# Patient Record
Sex: MALE | Race: WHITE | Employment: OTHER | URBAN - METROPOLITAN AREA
[De-identification: names, ages, dates, MRNs, and addresses within clinical notes are randomized per-mention and may not be internally consistent; named-entity substitution may affect disease eponyms.]

---

## 2017-01-01 ENCOUNTER — APPOINTMENT (OUTPATIENT)
Dept: GENERAL RADIOLOGY | Age: 77
DRG: 698 | End: 2017-01-01
Attending: EMERGENCY MEDICINE
Payer: MEDICARE

## 2017-01-01 ENCOUNTER — APPOINTMENT (OUTPATIENT)
Dept: CT IMAGING | Age: 77
DRG: 698 | End: 2017-01-01
Attending: EMERGENCY MEDICINE
Payer: MEDICARE

## 2017-01-01 ENCOUNTER — HOSPITAL ENCOUNTER (INPATIENT)
Age: 77
LOS: 1 days | DRG: 698 | End: 2017-01-06
Attending: EMERGENCY MEDICINE | Admitting: FAMILY MEDICINE
Payer: MEDICARE

## 2017-01-01 VITALS
DIASTOLIC BLOOD PRESSURE: 26 MMHG | BODY MASS INDEX: 25.33 KG/M2 | TEMPERATURE: 97.8 F | OXYGEN SATURATION: 79 % | WEIGHT: 187 LBS | HEIGHT: 72 IN | SYSTOLIC BLOOD PRESSURE: 38 MMHG

## 2017-01-01 DIAGNOSIS — R91.8 PULMONARY INFILTRATES ON CXR: ICD-10-CM

## 2017-01-01 DIAGNOSIS — A41.9 SEPSIS, DUE TO UNSPECIFIED ORGANISM: ICD-10-CM

## 2017-01-01 DIAGNOSIS — R41.82 ALTERED MENTAL STATUS, UNSPECIFIED ALTERED MENTAL STATUS TYPE: Primary | ICD-10-CM

## 2017-01-01 DIAGNOSIS — T83.511A URINARY TRACT INFECTION ASSOCIATED WITH INDWELLING URETHRAL CATHETER, INITIAL ENCOUNTER (HCC): ICD-10-CM

## 2017-01-01 DIAGNOSIS — N39.0 URINARY TRACT INFECTION ASSOCIATED WITH INDWELLING URETHRAL CATHETER, INITIAL ENCOUNTER (HCC): ICD-10-CM

## 2017-01-01 LAB
ALBUMIN SERPL BCP-MCNC: 3.6 G/DL (ref 3.4–5)
ALBUMIN/GLOB SERPL: 0.7 {RATIO} (ref 0.8–1.7)
ALP SERPL-CCNC: 87 U/L (ref 45–117)
ALT SERPL-CCNC: 33 U/L (ref 16–61)
ANION GAP BLD CALC-SCNC: 12 MMOL/L (ref 3–18)
APPEARANCE UR: ABNORMAL
APPEARANCE UR: ABNORMAL
APTT PPP: 27.1 SEC (ref 23–36.4)
AST SERPL W P-5'-P-CCNC: 23 U/L (ref 15–37)
ATRIAL RATE: 117 BPM
BACTERIA URNS QL MICRO: ABNORMAL /HPF
BACTERIA URNS QL MICRO: ABNORMAL /HPF
BASOPHILS # BLD AUTO: 0 K/UL (ref 0–0.1)
BASOPHILS # BLD: 0 % (ref 0–2)
BILIRUB SERPL-MCNC: 0.3 MG/DL (ref 0.2–1)
BILIRUB UR QL: ABNORMAL
BILIRUB UR QL: NEGATIVE
BNP SERPL-MCNC: 3118 PG/ML (ref 0–1800)
BUN SERPL-MCNC: 38 MG/DL (ref 7–18)
BUN/CREAT SERPL: 14 (ref 12–20)
CALCIUM SERPL-MCNC: 9.6 MG/DL (ref 8.5–10.1)
CALCULATED P AXIS, ECG09: 68 DEGREES
CALCULATED R AXIS, ECG10: -7 DEGREES
CALCULATED T AXIS, ECG11: 95 DEGREES
CHLORIDE SERPL-SCNC: 106 MMOL/L (ref 100–108)
CO2 SERPL-SCNC: 21 MMOL/L (ref 21–32)
COLOR UR: ABNORMAL
COLOR UR: YELLOW
CREAT SERPL-MCNC: 2.73 MG/DL (ref 0.6–1.3)
DIAGNOSIS, 93000: NORMAL
DIFFERENTIAL METHOD BLD: ABNORMAL
EOSINOPHIL # BLD: 0 K/UL (ref 0–0.4)
EOSINOPHIL NFR BLD: 0 % (ref 0–5)
EPITH CASTS URNS QL MICRO: ABNORMAL /LPF (ref 0–5)
ERYTHROCYTE [DISTWIDTH] IN BLOOD BY AUTOMATED COUNT: 13.9 % (ref 11.6–14.5)
FLUAV AG NPH QL IA: NEGATIVE
FLUBV AG NOSE QL IA: NEGATIVE
GLOBULIN SER CALC-MCNC: 5.4 G/DL (ref 2–4)
GLUCOSE SERPL-MCNC: 182 MG/DL (ref 74–99)
GLUCOSE UR STRIP.AUTO-MCNC: NEGATIVE MG/DL
GLUCOSE UR STRIP.AUTO-MCNC: NEGATIVE MG/DL
HCT VFR BLD AUTO: 43.2 % (ref 36–48)
HGB BLD-MCNC: 14.4 G/DL (ref 13–16)
HGB UR QL STRIP: ABNORMAL
HGB UR QL STRIP: ABNORMAL
INR PPP: 1.1 (ref 0.8–1.2)
KETONES UR QL STRIP.AUTO: NEGATIVE MG/DL
KETONES UR QL STRIP.AUTO: NEGATIVE MG/DL
LACTATE BLD-SCNC: 4.9 MMOL/L (ref 0.4–2)
LACTATE BLD-SCNC: 5.5 MMOL/L (ref 0.4–2)
LACTATE BLD-SCNC: 6.2 MMOL/L (ref 0.4–2)
LACTATE BLD-SCNC: 8.8 MMOL/L (ref 0.4–2)
LEUKOCYTE ESTERASE UR QL STRIP.AUTO: ABNORMAL
LEUKOCYTE ESTERASE UR QL STRIP.AUTO: ABNORMAL
LYMPHOCYTES # BLD AUTO: 18 % (ref 21–52)
LYMPHOCYTES # BLD: 2.9 K/UL (ref 0.9–3.6)
MCH RBC QN AUTO: 30.4 PG (ref 24–34)
MCHC RBC AUTO-ENTMCNC: 33.3 G/DL (ref 31–37)
MCV RBC AUTO: 91.3 FL (ref 74–97)
MONOCYTES # BLD: 1.3 K/UL (ref 0.05–1.2)
MONOCYTES NFR BLD AUTO: 8 % (ref 3–10)
NEUTS SEG # BLD: 11.5 K/UL (ref 1.8–8)
NEUTS SEG NFR BLD AUTO: 74 % (ref 40–73)
NITRITE UR QL STRIP.AUTO: NEGATIVE
NITRITE UR QL STRIP.AUTO: NEGATIVE
P-R INTERVAL, ECG05: 154 MS
PH UR STRIP: 7 [PH] (ref 5–8)
PH UR STRIP: 9 [PH] (ref 5–8)
PLATELET # BLD AUTO: 446 K/UL (ref 135–420)
PMV BLD AUTO: 9.6 FL (ref 9.2–11.8)
POTASSIUM SERPL-SCNC: 4.8 MMOL/L (ref 3.5–5.5)
PROT SERPL-MCNC: 9 G/DL (ref 6.4–8.2)
PROT UR STRIP-MCNC: 100 MG/DL
PROT UR STRIP-MCNC: >300 MG/DL
PROTHROMBIN TIME: 14 SEC (ref 11.5–15.2)
Q-T INTERVAL, ECG07: 326 MS
QRS DURATION, ECG06: 74 MS
QTC CALCULATION (BEZET), ECG08: 454 MS
RBC # BLD AUTO: 4.73 M/UL (ref 4.7–5.5)
RBC #/AREA URNS HPF: ABNORMAL /HPF (ref 0–5)
SODIUM SERPL-SCNC: 139 MMOL/L (ref 136–145)
SP GR UR REFRACTOMETRY: 1.01 (ref 1–1.03)
SP GR UR REFRACTOMETRY: 1.02 (ref 1–1.03)
TRI-PHOS CRY URNS QL MICRO: ABNORMAL
UROBILINOGEN UR QL STRIP.AUTO: 0.2 EU/DL (ref 0.2–1)
UROBILINOGEN UR QL STRIP.AUTO: 0.2 EU/DL (ref 0.2–1)
VENTRICULAR RATE, ECG03: 117 BPM
WBC # BLD AUTO: 15.7 K/UL (ref 4.6–13.2)
WBC URNS QL MICRO: ABNORMAL /HPF (ref 0–4)
WBC URNS QL MICRO: ABNORMAL /HPF (ref 0–4)

## 2017-01-01 PROCEDURE — 74011000250 HC RX REV CODE- 250: Performed by: EMERGENCY MEDICINE

## 2017-01-01 PROCEDURE — 96366 THER/PROPH/DIAG IV INF ADDON: CPT

## 2017-01-01 PROCEDURE — 85610 PROTHROMBIN TIME: CPT | Performed by: EMERGENCY MEDICINE

## 2017-01-01 PROCEDURE — 83605 ASSAY OF LACTIC ACID: CPT

## 2017-01-01 PROCEDURE — 96365 THER/PROPH/DIAG IV INF INIT: CPT

## 2017-01-01 PROCEDURE — 70450 CT HEAD/BRAIN W/O DYE: CPT

## 2017-01-01 PROCEDURE — 74176 CT ABD & PELVIS W/O CONTRAST: CPT

## 2017-01-01 PROCEDURE — 74011250636 HC RX REV CODE- 250/636: Performed by: FAMILY MEDICINE

## 2017-01-01 PROCEDURE — 96360 HYDRATION IV INFUSION INIT: CPT

## 2017-01-01 PROCEDURE — 96361 HYDRATE IV INFUSION ADD-ON: CPT

## 2017-01-01 PROCEDURE — 85730 THROMBOPLASTIN TIME PARTIAL: CPT | Performed by: EMERGENCY MEDICINE

## 2017-01-01 PROCEDURE — 74011000258 HC RX REV CODE- 258: Performed by: EMERGENCY MEDICINE

## 2017-01-01 PROCEDURE — 87086 URINE CULTURE/COLONY COUNT: CPT | Performed by: EMERGENCY MEDICINE

## 2017-01-01 PROCEDURE — 80053 COMPREHEN METABOLIC PANEL: CPT | Performed by: EMERGENCY MEDICINE

## 2017-01-01 PROCEDURE — 87804 INFLUENZA ASSAY W/OPTIC: CPT | Performed by: EMERGENCY MEDICINE

## 2017-01-01 PROCEDURE — 96367 TX/PROPH/DG ADDL SEQ IV INF: CPT

## 2017-01-01 PROCEDURE — 65660000000 HC RM CCU STEPDOWN

## 2017-01-01 PROCEDURE — 96375 TX/PRO/DX INJ NEW DRUG ADDON: CPT

## 2017-01-01 PROCEDURE — 83880 ASSAY OF NATRIURETIC PEPTIDE: CPT | Performed by: FAMILY MEDICINE

## 2017-01-01 PROCEDURE — 87186 SC STD MICRODIL/AGAR DIL: CPT | Performed by: EMERGENCY MEDICINE

## 2017-01-01 PROCEDURE — 99285 EMERGENCY DEPT VISIT HI MDM: CPT

## 2017-01-01 PROCEDURE — 51702 INSERT TEMP BLADDER CATH: CPT

## 2017-01-01 PROCEDURE — 81001 URINALYSIS AUTO W/SCOPE: CPT | Performed by: EMERGENCY MEDICINE

## 2017-01-01 PROCEDURE — 74011250636 HC RX REV CODE- 250/636: Performed by: EMERGENCY MEDICINE

## 2017-01-01 PROCEDURE — 71010 XR CHEST PORT: CPT

## 2017-01-01 PROCEDURE — 87077 CULTURE AEROBIC IDENTIFY: CPT | Performed by: EMERGENCY MEDICINE

## 2017-01-01 PROCEDURE — 87040 BLOOD CULTURE FOR BACTERIA: CPT | Performed by: EMERGENCY MEDICINE

## 2017-01-01 PROCEDURE — 85025 COMPLETE CBC W/AUTO DIFF WBC: CPT | Performed by: EMERGENCY MEDICINE

## 2017-01-01 PROCEDURE — 93005 ELECTROCARDIOGRAM TRACING: CPT

## 2017-01-01 RX ORDER — FUROSEMIDE 10 MG/ML
40 INJECTION INTRAMUSCULAR; INTRAVENOUS
Status: COMPLETED | OUTPATIENT
Start: 2017-01-01 | End: 2017-01-01

## 2017-01-01 RX ORDER — METOPROLOL TARTRATE 5 MG/5ML
2.5 INJECTION INTRAVENOUS ONCE
Status: COMPLETED | OUTPATIENT
Start: 2017-01-01 | End: 2017-01-01

## 2017-01-01 RX ORDER — SODIUM CHLORIDE 9 MG/ML
75 INJECTION, SOLUTION INTRAVENOUS CONTINUOUS
Status: DISCONTINUED | OUTPATIENT
Start: 2017-01-01 | End: 2017-01-01 | Stop reason: HOSPADM

## 2017-01-01 RX ADMIN — SODIUM CHLORIDE 250 ML: 900 INJECTION, SOLUTION INTRAVENOUS at 00:50

## 2017-01-01 RX ADMIN — SODIUM CHLORIDE 75 ML/HR: 900 INJECTION, SOLUTION INTRAVENOUS at 21:34

## 2017-01-01 RX ADMIN — METOPROLOL TARTRATE 2.5 MG: 5 INJECTION INTRAVENOUS at 16:05

## 2017-01-01 RX ADMIN — SODIUM CHLORIDE 1000 ML: 900 INJECTION, SOLUTION INTRAVENOUS at 13:52

## 2017-01-01 RX ADMIN — PIPERACILLIN SODIUM AND TAZOBACTAM SODIUM 4.5 G: 4; .5 INJECTION, POWDER, LYOPHILIZED, FOR SOLUTION INTRAVENOUS at 14:14

## 2017-01-01 RX ADMIN — FUROSEMIDE 40 MG: 10 INJECTION, SOLUTION INTRAVENOUS at 15:10

## 2017-01-01 RX ADMIN — VANCOMYCIN HYDROCHLORIDE 1696 MG: 10 INJECTION, POWDER, LYOPHILIZED, FOR SOLUTION INTRAVENOUS at 16:50

## 2017-01-05 NOTE — H&P
History and Physical    Patient: Pari Petersen MRN: [id number]  SSN: [id number]    YOB: 1940  Age: 67 y.o. Sex: male      Subjective:      Pari Petersen is a 67 y.o. male who is a nursing home patient with dementia and has indwelling oh due to urinary retention. Patient sent to the ER because he was \"complaining of chest pain and was unresponsive\". In ER, patient appears septic with blocked oh, very elevated lactic acid acute on chronic renal failure and tachycardia and tachypnea. Admit for sepsis. Past Medical History   Diagnosis Date    CAD (coronary artery disease)     Card[facility] arrest ([facility]      2007-8    [facility]congestive heart failure) ([facility])     Heart abnormality      cardiomyopathy, aortic stenosis, v fib arrest, CABGx4    Heart failure ([facility]     Hypertension     Myocardial infarction ([facility]     Seizures ([facility]      anoxic brain injury     No past surgical history on file. No family history on file. Social History   Substance Use Topics    Smoking status: Former Smoker     Packs/day: 1.00    Smokeless tobacco: Not on file      Comment: Quite 2 yrs ago    Alcohol use No      Prior to Admission medications    Medication Sig Start Date End Date Taking? Authorizing Provider   acetaminophen (TYLENOL) 325 mg tablet Take 650 mg by mouth every four (4) hours as needed for Pain or Fever (OR PAIN). Johnny Wagner MD   metoprolol succinate (TOPROL XL) 25 mg XL tablet Take 25 mg by mouth two (2) times a day. Johnny Wagner MD   alum-mag hydroxide-simeth (ANTACID LIQUID) 200-200-20 mg/5 mL susp Take 30 mL by mouth four (4) times daily as needed for Nausea. Johnny Wagner MD   carvedilol (COREG) 6.25 mg tablet Take 6.25 mg by mouth two (2) times daily (with meals). Johnny Wagner MD   bisacodyl (DULCOLAX, BISACODYL,) 10 mg suppository Insert 10 mg into rectum daily.     Johnny Wagner MD   magnesium hydroxide (JUDD MILK OF MAGNESIA) 400 mg/5 mL suspension Take 30 mL by mouth daily as needed for Constipation. Johnny Wagner MD   metoclopramide HCl (REGLAN) 10 mg tablet Take 10 mg by mouth Before breakfast, lunch, dinner and at bedtime. Johnny Wagner MD   ondansetron (ZOFRAN ODT) 8 mg disintegrating tablet Take 1 Tab by mouth every eight (8) hours as needed for Nausea for 12 doses. 7/29/14   Marifer Lopez MD   potassium chloride (KLOR-CON) 20 mEq packet Take 1 Packet by mouth two (2) times a day. 7/24/14   Ilya Crook MD   QUEtiapine (SEROQUEL) 25 mg tablet Take 1 Tab by mouth nightly. 7/22/14   Ilya Crook MD   valproic acid, as sodium salt, (DEPAKENE) 250 mg/5 mL (5 mL) soln oral solution 10 mL by Per G Tube route daily. 7/22/14   Ilya Crook MD   simvastatin (ZOCOR) 20 mg tablet Take 20 mg by mouth nightly. Johnny Wagner MD   aspirin 81 mg tablet Take 81 mg by mouth daily. Johnny Wagner MD   albuterol (VENTOLIN HFA) 90 mcg/actuation inhaler Take 2 Puffs by inhalation every four (4) hours as needed. Johnny Wagner MD        Allergies   Allergen Reactions    Cheesecake Flavor [Flavoring Agent] Nausea and Vomiting       Review of Systems:  Review of systems not obtained due to patient factors. Objective:     Vitals:    01/05/17 1700 01/05/17 1715 01/05/17 1730 01/05/17 1745   BP: 132/80 133/74 129/85    Pulse: (!) 146 (!) 143 (!) 142 (!) 138   Resp: (!) 57 (!) 59 (!) 56 (!) 54   Temp:       SpO2: 94% 94% 94% 93%   Weight:       Height:            Physical Exam:  GENERAL: appears older than stated age, toxic  LUNG: rhonchi R base, L base  HEART: regular rate and rhythm  tachycardic  ABDOMEN: soft, non-tender.  Bowel sounds normal. No masses,  no organomegaly  EXTREMITIES:  edema trace    Assessment:     Hospital Problems  Date Reviewed: 1/5/2017          Codes Class Noted POA    Sepsis due to urinary tract infection ([facility] ICD-10-CM: A41.9, N39.0  ICD-9-CM: 038.9, 995.91, 599.0  8/12/2014 Yes        Urinary retention ICD-10-CM: R33.9  ICD-9-CM: 788.20  7/29/2014 Yes Acute on chronic renal failure (HCC) ICD-10-CM: N17.9, N18.9  ICD-9-CM: 584.9, 585.9  6/28/2014 Yes        Sepsis ([facility] ICD-10-CM: A41.9  ICD-9-CM: 038.9, 995.91  6/28/2014 Unknown              Plan:     Admit Zosyn and vancomycin IV fluid resuscitation. Check BNP but cxr shows more fibrosis and atalectasis instead of pleural effusion. Overall poor prognosis.     Signed By: Karyle Spring, MD     January 5, 2017

## 2017-01-05 NOTE — ED PROVIDER NOTES
HPI Comments: 1:15 PM Roselyn Frias is a 67 y.o. male w/ hx of CHF, HTN, CAD, and seizures who presents to the ED, via EMS, for AMS. Pt is a resident of [address] [place] [address],[address], who called EMS this afternoon, c/o AMS and R facial droop onset 12:40 PM. Pt is normally A&O x3. Pt  Is confined to a wheelchair due to prior medical hx. Pt is c/o HA, per staff. No other history able to be obtained at this time. The history is provided by the EMS personnel. Past Medical History:   Diagnosis Date    CAD (coronary artery disease)     Cardiac arrest ([facility]      2007-8    [facility]congestive heart failure) ([facility])     Heart abnormality      cardiomyopathy, aortic stenosis, v fib arrest, CABGx4    Heart failure ([facility]     Hypertension     Myocardial infarction ([facility]     Seizures ([facility]      anoxic brain injury       No past surgical history on file. No family history on file. Social History     Social History    Marital status: SINGLE     Spouse name: N/A    Number of children: N/A    Years of education: N/A     Occupational History    Not on file. Social History Main Topics    Smoking status: Former Smoker     Packs/day: 1.00    Smokeless tobacco: Not on file      Comment: Quite 2 yrs ago    Alcohol use No    Drug use: No    Sexual activity: No     Other Topics Concern    Not on file     Social History Narrative         ALLERGIES: Cheesecake flavor [flavoring agent]    Review of Systems   Unable to perform ROS: Acuity of condition       Vitals:    01/05/17 1330 01/05/17 1345 01/05/17 1400 01/05/17 1415   BP: 130/69 136/80 148/87 142/79   Pulse: (!) 121 (!) 119 (!) 123 (!) 126   Resp: (!) 36 (!) 34 (!) 36 (!) 39   Temp:       SpO2: 94% 93% 99% 99%   Weight:       Height:                Physical Exam   HENT:   Head: Atraumatic. Eyes: Conjunctivae are normal. Pupils are equal, round, and reactive to light. Neck: Normal range of motion. Neck supple. No JVD present.    Cardiovascular:   tachycardic Pulmonary/Chest: He exhibits no tenderness. Increased work of breathing, coarse breath sounds bilaterally   Abdominal: Soft. Bowel sounds are normal. He exhibits distension. There is tenderness ( diffuse). Genitourinary:   Genitourinary Comments: Oh in place, erosion to urethra   Musculoskeletal: He exhibits no edema. Neurological: He is alert. No obvious facial asymmetry, pt able to state his name but dysarthric, intermittently following commands, no clonus, unable to test strength or sensation due to mental status   Skin: Skin is warm and dry. Perineum wnl, no breakdown   Nursing note and vitals reviewed. MDM  Number of Diagnoses or Management Options  Altered mental status, unspecified altered mental status type:   Pulmonary infiltrates on CXR:   Sepsis, due to unspecified organism [facility]):   Urinary tract infection associated with indwelling urethral catheter, initial encounter:   Diagnosis management comments: Otilia Chiang is a 67 y.o. male presenting with ams, code S called on arrival. Ct head without acute finding. patronus has evaluated pt. Feels more c/w post ictal vs infectious process. History limited but septic work up started once initial labs resulted. Pt started on iv fluids but respiratory status worsened so these stopped and dose of lasix given. Pt with oh in place, urinary distension on ct. ua c/w uti and cxr with concern for infiltrates. Initial lacate over 4, holding on fluids as stated above. abx and cultures ordered. Will trend lactate and monitor I/Os closely.      ED Course       Procedures          Vitals:  Patient Vitals for the past 12 hrs:   Temp Pulse Resp BP SpO2   01/05/17 1415 - (!) 126 (!) 39 142/79 99 %   01/05/17 1400 - (!) 123 (!) 36 148/87 99 %   01/05/17 1345 - (!) 119 (!) 34 136/80 93 %   01/05/17 1330 - (!) 121 (!) 36 130/69 94 %   01/05/17 1322 97.8 °F (36.6 °C) (!) 116 (!) 36 159/77 94 %   01/05/17 1320 - - - - 93 %   01/05/17 1315 - (!) 118 (!) 36 158/79 93 %           EKG interpretation by ED Physician:  Sinus tach at rate of 117; normal axis; no STEMI        X-Ray, CT or other radiology findings or impressions:  CT HEAD WO CONT   IMPRESSION:     No evidence of intracranial hemorrhages or any other definable acute  intracranial process.     Moderate cerebral atrophy and at least moderate chronic microvascular ischemic  changes in white matter of cerebrum. Mild-to-moderate cerebellar atrophy.     Old lacunar infarction in left lentiform nucleus.     The findings have been discussed with ER physician Dr. Riddle, at 1328 hours  on 1/5/2017. As interpreted by RAD   XR CHEST PORT   Impression:     1. New streaky densities in both lower lung zones, left greater than right. Possible recurrent airspace opacities in the left costophrenic angle region. Possibly related to developing pneumonia vs. atelectatic changes.     2. Previous CABG and aortic valve replacement. As interpreted by RAD           Progress notes, Consult notes or additional Procedure notes:   1:20 PM, 1/5/2017   Consult:  Discussed care with Dr. Kishan Crow, tele-neurology. Standard discussion; including history of patients chief complaint, available diagnostic results, and treatment course. Agrees to evaluate pt in the ED    1:45PM - Dr Kishan Crow evaluated pt, does not feel c/w stroke, more likely post ictal vs infectious process. No indication for tpa at this time. 3:57 PM ct reviewed. Will proceed with admission to Dr Marysol Hernandes        Disposition:  Diagnosis:   1. Altered mental status, unspecified altered mental status type    2. Sepsis, due to unspecified organism ([facility] [address]    3. Urinary tract infection associated with indwelling urethral catheter, initial encounter    4.  Pulmonary infiltrates on CXR        Disposition: Admitted           Scribe Attestation:   Yanira SOSA am scribing for and in the presence of Nikita Martinez MD on this day 01/05/17 at 1:15 PM   Yanira Gallegos carrol    Provider Attestation:  I personally performed the services described in the documentation, reviewed the documentation, as recorded by the scribe in my presence, and it accurately and completely records my words and actions.   Rosa Chow MD. 1:15 PM      Signed by: Carrol Tinajero, 1:15 PM

## 2017-01-05 NOTE — ED NOTES
Patient from [facility], noticed altered level of consciousness, decreased speech starting at approx. 1240 per nursing staff. Patient w/o any defined unilateral weakness. Tachycardic at 120, BP stable, gulcose 180 per EMS. Patient arrived with indwelling oh catheter. Alert and following commands on arrival, can state name, can not state any other answers when asked.

## 2017-01-06 NOTE — PROGRESS NOTES
responded to radha, for Antonia Davis, who is a 67 y. o.,male. Requested by staff as patient at end of life. The  provided the following Interventions:  Continued care and support for patient and staff. Prayed for patient. The following outcomes were achieved:  Patient .       Angelito Cespedes [facility]  [phone]

## 2017-01-06 NOTE — ED NOTES
Pt desat 88-77%. Placed on NRB sat 83-77. Blank stare , sign pale,  Agonal breathing, BP dropping (v/s flow sheet0. MD Roma Sewell notified. Will continue to monitor.

## 2017-01-06 NOTE — ED NOTES
BSR received from [facility]. Pt a/0 x1, (self). Blank stare and responds to name call at this time. Rattling noted when pt respirations assessed. Will f/u with Md for orders. Will continue to monitor. No family at bedside.

## 2017-01-06 NOTE — ED NOTES
Discussed pt status w/ Dr. Jazmine Castillo fluids 0.9 ns  modified to 75ml/hr. Will conitnue to monitor pt.

## 2017-01-06 NOTE — ED NOTES
Dr. Ana Lay on phone, pt's heart auscultated not beating at this time, no heart rate confirmed by Staci Garza RN, Zero pulse and Zero respirations noted on monitor. Pt's time of death confirmed and noted by Dr. Ana Lay.

## 2017-01-06 NOTE — ED NOTES
Pt's O2 sats at 70% on 6L NC, pt placed on nonrebreather, Dr. Diego Yen notified on pt's change in status.

## 2017-01-06 NOTE — ED NOTES
Kristen Printers from [address] [place] [address] notified this RN that [facility] [address] [facility], [address] [place] [address] will be icpking up pt from the [facility].

## 2017-01-06 NOTE — PROGRESS NOTES
responded to page for  Sara Humphreys, who is a 67 y. o.,male, who is in critical condition, nurse requested  visit. The  provided the following Interventions:  Provided crisis pastoral care and pastoral support. Prayed for the patient. Read the Daily word to the patient. Chart reviewed. The following outcomes were achieved:  Patient able to make small eye contact with . Patient not able to talk. Assessment:  There are no spiritual or Congregation issues which require intervention at this time. Plan:  Chaplains will continue to follow and will provide pastoral care on an as needed/requested basis.  recommends bedside caregivers page  on duty if patient shows signs of acute spiritual or emotional distress.        Lyndonyolanda Darling  [address] [place] [address]  [phone]

## 2017-01-06 NOTE — DISCHARGE SUMMARY
Discharge Summary     Patient: Catalino Pardo MRN: [id number]  SSN: [id number]    YOB: 1940  Age: 67 y.o. Sex: male       Admit Date: 2017    Discharge Date: 2017      Admission Diagnoses: Sepsis [facility])    Discharge Diagnoses:   Problem List as of 2017  Date Reviewed: 2017          Codes Class Noted - Resolved    Sepsis due to urinary tract infection ([facility] ICD-10-CM: A41.9, N39.0  ICD-9-CM: 038.9, 995.91, 599.0  2014 - Present        Urinary retention ICD-10-CM: R33.9  ICD-9-CM: 788.20  2014 - Present        Nausea with vomiting ICD-10-CM: R11.2  ICD-9-CM: 787.01  2014 - Present        Coronary atherosclerosis of native coronary artery ICD-10-CM: I25.10  ICD-9-CM: 414.01  2014 - Present        Troponin level elevated ICD-10-CM: R79.89  ICD-9-CM: 790.6  2014 - Present        Congestive heart failure, unspecified ICD-10-CM: I50.9  ICD-9-CM: 428.0  2014 - Present        Postsurgical aortocoronary bypass status ICD-10-CM: Z95.1  ICD-9-CM: V45.81  2014 - Present        Acute on chronic renal failure (HCC) ICD-10-CM: N17.9, N18.9  ICD-9-CM: 584.9, 585.9  2014 - Present        Acute respiratory failure ([facility] ICD-10-CM: J96.00  ICD-9-CM: 518.81  2014 - Present        Aspiration pneumonia ([facility] ICD-10-CM: J69.0  ICD-9-CM: 507.0  2014 - Present        Sepsis ([facility] ICD-10-CM: A41.9  ICD-9-CM: 038.9, 995.91  2014 - Present        HTN (hypertension) ICD-10-CM: I10  ICD-9-CM: 401.9  2014 - Present        Nasal contusion ICD-10-CM: S00.33XA  ICD-9-CM: 920  2014 - Present        Head injury ICD-10-CM: S09. 90XA  ICD-9-CM: 959.01  2014 - Present        Fall ICD-10-CM: W19. Luis Antonio Lawman  ICD-9-CM: E888.9  2014 - Present               Discharge Condition:     Hospital Course: Patient with history of dementia and urinary retention requiring indwelling oh sent to the ER by nursing home when they noted that he was unresponsive.  In ER, Patient noted to be poorly responsive with tachycardia and very elevated lactic acid as well as blocked oh . Patient with gram negative rods in urine. Patient was a DNR. Patient started on fluid resuscitation and antibiotics, Lactic acidosis did improve but patient wernt fron tachycaedia and normotensive to acute bradycardia and hypotension. Further fluid bolus given but patient became asystolic and . Patient was sent to ER with advanced sepsis. Patient noted to have fibrotic lungs on cxr and history of aortic kate disease. Consults: None    Significant Diagnostic Studies: microbiology: urine culture: positive for gram negative rods and radiology: CXR:lungs fibrotic  And atalectasis  No infiltrate noted. Disposition:     Discharge Medications:   Cannot display discharge medications since this patient is not currently admitted. Activity: N/A  Diet: N/A  Wound Care: None needed    No orders of the defined types were placed in this encounter.       Signed By: Emi Puente MD     2017

## 2017-01-06 NOTE — ED NOTES
2611 Dr. Karson Julien notified that pt . 0110 Charge Rn (Mariam Benitez) notified of pt's status. [address] at [facility] care notified. 0128 [facility] informed Bunny Javier) that  pt . Not a candidate per [facility]. 6183 Postmortem care performed. 0250 Pt taken to [facility].

## 2017-01-08 LAB
BACTERIA SPEC CULT: NORMAL
BACTERIA SPEC CULT: NORMAL
SERVICE CMNT-IMP: NORMAL

## 2017-01-11 LAB
BACTERIA SPEC CULT: NORMAL
BACTERIA SPEC CULT: NORMAL
SERVICE CMNT-IMP: NORMAL
SERVICE CMNT-IMP: NORMAL